# Patient Record
Sex: FEMALE | Race: OTHER | URBAN - METROPOLITAN AREA
[De-identification: names, ages, dates, MRNs, and addresses within clinical notes are randomized per-mention and may not be internally consistent; named-entity substitution may affect disease eponyms.]

---

## 2024-09-25 ENCOUNTER — EMERGENCY (EMERGENCY)
Facility: HOSPITAL | Age: 49
LOS: 1 days | Discharge: HOME | End: 2024-09-25
Attending: EMERGENCY MEDICINE | Admitting: EMERGENCY MEDICINE
Payer: COMMERCIAL

## 2024-09-25 VITALS
SYSTOLIC BLOOD PRESSURE: 108 MMHG | TEMPERATURE: 98 F | RESPIRATION RATE: 18 BRPM | OXYGEN SATURATION: 98 % | HEART RATE: 71 BPM | DIASTOLIC BLOOD PRESSURE: 68 MMHG

## 2024-09-25 VITALS
SYSTOLIC BLOOD PRESSURE: 111 MMHG | WEIGHT: 177.03 LBS | OXYGEN SATURATION: 100 % | DIASTOLIC BLOOD PRESSURE: 69 MMHG | RESPIRATION RATE: 18 BRPM | HEIGHT: 66 IN | HEART RATE: 94 BPM | TEMPERATURE: 98 F

## 2024-09-25 DIAGNOSIS — D64.9 ANEMIA, UNSPECIFIED: ICD-10-CM

## 2024-09-25 DIAGNOSIS — R06.02 SHORTNESS OF BREATH: ICD-10-CM

## 2024-09-25 DIAGNOSIS — N93.9 ABNORMAL UTERINE AND VAGINAL BLEEDING, UNSPECIFIED: ICD-10-CM

## 2024-09-25 DIAGNOSIS — R07.9 CHEST PAIN, UNSPECIFIED: ICD-10-CM

## 2024-09-25 LAB
ADD ON TEST-SPECIMEN IN LAB: SIGNIFICANT CHANGE UP
ANION GAP SERPL CALC-SCNC: 9 MMOL/L — SIGNIFICANT CHANGE UP (ref 5–17)
APTT BLD: 21.8 SEC — LOW (ref 24.5–35.6)
BASOPHILS # BLD AUTO: 0.12 K/UL — SIGNIFICANT CHANGE UP (ref 0–0.2)
BASOPHILS NFR BLD AUTO: 1.8 % — SIGNIFICANT CHANGE UP (ref 0–2)
BLD GP AB SCN SERPL QL: NEGATIVE — SIGNIFICANT CHANGE UP
BUN SERPL-MCNC: 13 MG/DL — SIGNIFICANT CHANGE UP (ref 7–23)
CALCIUM SERPL-MCNC: 9 MG/DL — SIGNIFICANT CHANGE UP (ref 8.4–10.5)
CHLORIDE SERPL-SCNC: 102 MMOL/L — SIGNIFICANT CHANGE UP (ref 96–108)
CO2 SERPL-SCNC: 25 MMOL/L — SIGNIFICANT CHANGE UP (ref 22–31)
CREAT SERPL-MCNC: 0.89 MG/DL — SIGNIFICANT CHANGE UP (ref 0.5–1.3)
EGFR: 81 ML/MIN/1.73M2 — SIGNIFICANT CHANGE UP
EGFR: 81 ML/MIN/1.73M2 — SIGNIFICANT CHANGE UP
EOSINOPHIL # BLD AUTO: 0 K/UL — SIGNIFICANT CHANGE UP (ref 0–0.5)
EOSINOPHIL NFR BLD AUTO: 0 % — SIGNIFICANT CHANGE UP (ref 0–6)
GLUCOSE SERPL-MCNC: 96 MG/DL — SIGNIFICANT CHANGE UP (ref 70–99)
HCG SERPL-ACNC: <1 MIU/ML — SIGNIFICANT CHANGE UP
HCT VFR BLD CALC: 21.4 % — LOW (ref 34.5–45)
HGB BLD-MCNC: 6.2 G/DL — CRITICAL LOW (ref 11.5–15.5)
INR BLD: 1.03 — SIGNIFICANT CHANGE UP (ref 0.85–1.16)
LYMPHOCYTES # BLD AUTO: 0.47 K/UL — LOW (ref 1–3.3)
LYMPHOCYTES # BLD AUTO: 7.1 % — LOW (ref 13–44)
MCHC RBC-ENTMCNC: 20.3 PG — LOW (ref 27–34)
MCHC RBC-ENTMCNC: 29 GM/DL — LOW (ref 32–36)
MCV RBC AUTO: 70.2 FL — LOW (ref 80–100)
MONOCYTES # BLD AUTO: 0.06 K/UL — SIGNIFICANT CHANGE UP (ref 0–0.9)
MONOCYTES NFR BLD AUTO: 0.9 % — LOW (ref 2–14)
NEUTROPHILS # BLD AUTO: 5.94 K/UL — SIGNIFICANT CHANGE UP (ref 1.8–7.4)
NEUTROPHILS NFR BLD AUTO: 90.2 % — HIGH (ref 43–77)
PLATELET # BLD AUTO: 262 K/UL — SIGNIFICANT CHANGE UP (ref 150–400)
POTASSIUM SERPL-MCNC: 3.9 MMOL/L — SIGNIFICANT CHANGE UP (ref 3.5–5.3)
POTASSIUM SERPL-SCNC: 3.9 MMOL/L — SIGNIFICANT CHANGE UP (ref 3.5–5.3)
PROTHROM AB SERPL-ACNC: 11.8 SEC — SIGNIFICANT CHANGE UP (ref 9.9–13.4)
RBC # BLD: 3.05 M/UL — LOW (ref 3.8–5.2)
RBC # FLD: 19.3 % — HIGH (ref 10.3–14.5)
RH IG SCN BLD-IMP: POSITIVE — SIGNIFICANT CHANGE UP
RH IG SCN BLD-IMP: POSITIVE — SIGNIFICANT CHANGE UP
SODIUM SERPL-SCNC: 136 MMOL/L — SIGNIFICANT CHANGE UP (ref 135–145)
WBC # BLD: 6.58 K/UL — SIGNIFICANT CHANGE UP (ref 3.8–10.5)
WBC # FLD AUTO: 6.58 K/UL — SIGNIFICANT CHANGE UP (ref 3.8–10.5)

## 2024-09-25 PROCEDURE — 83550 IRON BINDING TEST: CPT

## 2024-09-25 PROCEDURE — 86850 RBC ANTIBODY SCREEN: CPT

## 2024-09-25 PROCEDURE — 93005 ELECTROCARDIOGRAM TRACING: CPT

## 2024-09-25 PROCEDURE — 85610 PROTHROMBIN TIME: CPT

## 2024-09-25 PROCEDURE — 84702 CHORIONIC GONADOTROPIN TEST: CPT

## 2024-09-25 PROCEDURE — 86900 BLOOD TYPING SEROLOGIC ABO: CPT

## 2024-09-25 PROCEDURE — 86901 BLOOD TYPING SEROLOGIC RH(D): CPT

## 2024-09-25 PROCEDURE — 76830 TRANSVAGINAL US NON-OB: CPT | Mod: 26

## 2024-09-25 PROCEDURE — 76856 US EXAM PELVIC COMPLETE: CPT | Mod: 26

## 2024-09-25 PROCEDURE — 82728 ASSAY OF FERRITIN: CPT

## 2024-09-25 PROCEDURE — 86923 COMPATIBILITY TEST ELECTRIC: CPT

## 2024-09-25 PROCEDURE — 36415 COLL VENOUS BLD VENIPUNCTURE: CPT

## 2024-09-25 PROCEDURE — 85025 COMPLETE CBC W/AUTO DIFF WBC: CPT

## 2024-09-25 PROCEDURE — 80048 BASIC METABOLIC PNL TOTAL CA: CPT

## 2024-09-25 PROCEDURE — 99285 EMERGENCY DEPT VISIT HI MDM: CPT | Mod: 25

## 2024-09-25 PROCEDURE — 99291 CRITICAL CARE FIRST HOUR: CPT

## 2024-09-25 PROCEDURE — P9016: CPT

## 2024-09-25 PROCEDURE — 82746 ASSAY OF FOLIC ACID SERUM: CPT

## 2024-09-25 PROCEDURE — 76856 US EXAM PELVIC COMPLETE: CPT

## 2024-09-25 PROCEDURE — 76830 TRANSVAGINAL US NON-OB: CPT

## 2024-09-25 PROCEDURE — 82607 VITAMIN B-12: CPT

## 2024-09-25 PROCEDURE — 85730 THROMBOPLASTIN TIME PARTIAL: CPT

## 2024-09-25 PROCEDURE — 83540 ASSAY OF IRON: CPT

## 2024-09-25 PROCEDURE — 93010 ELECTROCARDIOGRAM REPORT: CPT

## 2024-09-25 RX ADMIN — Medication 1000 MILLILITER(S): at 16:37

## 2024-09-25 RX ADMIN — Medication 1000 MILLILITER(S): at 17:00

## 2024-10-11 ENCOUNTER — EMERGENCY (EMERGENCY)
Facility: HOSPITAL | Age: 49
LOS: 1 days | Discharge: HOME | End: 2024-10-11
Attending: STUDENT IN AN ORGANIZED HEALTH CARE EDUCATION/TRAINING PROGRAM | Admitting: STUDENT IN AN ORGANIZED HEALTH CARE EDUCATION/TRAINING PROGRAM
Payer: COMMERCIAL

## 2024-10-11 VITALS
RESPIRATION RATE: 16 BRPM | HEIGHT: 66 IN | WEIGHT: 169.98 LBS | TEMPERATURE: 98 F | HEART RATE: 67 BPM | DIASTOLIC BLOOD PRESSURE: 77 MMHG | OXYGEN SATURATION: 99 % | SYSTOLIC BLOOD PRESSURE: 117 MMHG

## 2024-10-11 VITALS
SYSTOLIC BLOOD PRESSURE: 103 MMHG | DIASTOLIC BLOOD PRESSURE: 66 MMHG | HEART RATE: 60 BPM | RESPIRATION RATE: 18 BRPM | TEMPERATURE: 99 F | OXYGEN SATURATION: 100 %

## 2024-10-11 DIAGNOSIS — R42 DIZZINESS AND GIDDINESS: ICD-10-CM

## 2024-10-11 DIAGNOSIS — R51.9 HEADACHE, UNSPECIFIED: ICD-10-CM

## 2024-10-11 DIAGNOSIS — R30.9 PAINFUL MICTURITION, UNSPECIFIED: ICD-10-CM

## 2024-10-11 DIAGNOSIS — D64.9 ANEMIA, UNSPECIFIED: ICD-10-CM

## 2024-10-11 DIAGNOSIS — N93.9 ABNORMAL UTERINE AND VAGINAL BLEEDING, UNSPECIFIED: ICD-10-CM

## 2024-10-11 DIAGNOSIS — N80.03 ADENOMYOSIS OF THE UTERUS: ICD-10-CM

## 2024-10-11 DIAGNOSIS — R10.30 LOWER ABDOMINAL PAIN, UNSPECIFIED: ICD-10-CM

## 2024-10-11 PROBLEM — Z78.9 OTHER SPECIFIED HEALTH STATUS: Chronic | Status: ACTIVE | Noted: 2024-09-25

## 2024-10-11 LAB
ANION GAP SERPL CALC-SCNC: 10 MMOL/L — SIGNIFICANT CHANGE UP (ref 5–17)
ANISOCYTOSIS BLD QL: SLIGHT — SIGNIFICANT CHANGE UP
APTT BLD: 22.8 SEC — LOW (ref 24.5–35.6)
BASOPHILS # BLD AUTO: 0 K/UL — SIGNIFICANT CHANGE UP (ref 0–0.2)
BASOPHILS NFR BLD AUTO: 0 % — SIGNIFICANT CHANGE UP (ref 0–2)
BLD GP AB SCN SERPL QL: NEGATIVE — SIGNIFICANT CHANGE UP
BUN SERPL-MCNC: 13 MG/DL — SIGNIFICANT CHANGE UP (ref 7–23)
CALCIUM SERPL-MCNC: 8.7 MG/DL — SIGNIFICANT CHANGE UP (ref 8.4–10.5)
CHLORIDE SERPL-SCNC: 102 MMOL/L — SIGNIFICANT CHANGE UP (ref 96–108)
CO2 SERPL-SCNC: 23 MMOL/L — SIGNIFICANT CHANGE UP (ref 22–31)
CREAT SERPL-MCNC: 0.7 MG/DL — SIGNIFICANT CHANGE UP (ref 0.5–1.3)
EGFR: 108 ML/MIN/1.73M2 — SIGNIFICANT CHANGE UP
ELLIPTOCYTES BLD QL SMEAR: SLIGHT — SIGNIFICANT CHANGE UP
EOSINOPHIL # BLD AUTO: 0 K/UL — SIGNIFICANT CHANGE UP (ref 0–0.5)
EOSINOPHIL NFR BLD AUTO: 0 % — SIGNIFICANT CHANGE UP (ref 0–6)
GIANT PLATELETS BLD QL SMEAR: PRESENT — SIGNIFICANT CHANGE UP
GLUCOSE SERPL-MCNC: 107 MG/DL — HIGH (ref 70–99)
HCG SERPL-ACNC: <1 MIU/ML — SIGNIFICANT CHANGE UP
HCT VFR BLD CALC: 25.3 % — LOW (ref 34.5–45)
HGB BLD-MCNC: 7.4 G/DL — LOW (ref 11.5–15.5)
HYPOCHROMIA BLD QL: SIGNIFICANT CHANGE UP
INR BLD: 1.03 — SIGNIFICANT CHANGE UP (ref 0.85–1.16)
LYMPHOCYTES # BLD AUTO: 0.28 K/UL — LOW (ref 1–3.3)
LYMPHOCYTES # BLD AUTO: 3.5 % — LOW (ref 13–44)
MANUAL SMEAR VERIFICATION: SIGNIFICANT CHANGE UP
MCHC RBC-ENTMCNC: 20.6 PG — LOW (ref 27–34)
MCHC RBC-ENTMCNC: 29.2 GM/DL — LOW (ref 32–36)
MCV RBC AUTO: 70.3 FL — LOW (ref 80–100)
MICROCYTES BLD QL: SLIGHT — SIGNIFICANT CHANGE UP
MONOCYTES # BLD AUTO: 0.34 K/UL — SIGNIFICANT CHANGE UP (ref 0–0.9)
MONOCYTES NFR BLD AUTO: 4.3 % — SIGNIFICANT CHANGE UP (ref 2–14)
NEUTROPHILS # BLD AUTO: 7.36 K/UL — SIGNIFICANT CHANGE UP (ref 1.8–7.4)
NEUTROPHILS NFR BLD AUTO: 92.2 % — HIGH (ref 43–77)
PLAT MORPH BLD: ABNORMAL
PLATELET # BLD AUTO: 215 K/UL — SIGNIFICANT CHANGE UP (ref 150–400)
POIKILOCYTOSIS BLD QL AUTO: SIGNIFICANT CHANGE UP
POLYCHROMASIA BLD QL SMEAR: SIGNIFICANT CHANGE UP
POTASSIUM SERPL-MCNC: 4.1 MMOL/L — SIGNIFICANT CHANGE UP (ref 3.5–5.3)
POTASSIUM SERPL-SCNC: 4.1 MMOL/L — SIGNIFICANT CHANGE UP (ref 3.5–5.3)
PROTHROM AB SERPL-ACNC: 12 SEC — SIGNIFICANT CHANGE UP (ref 9.9–13.4)
RBC # BLD: 3.6 M/UL — LOW (ref 3.8–5.2)
RBC # FLD: 21.2 % — HIGH (ref 10.3–14.5)
RBC BLD AUTO: ABNORMAL
RH IG SCN BLD-IMP: POSITIVE — SIGNIFICANT CHANGE UP
SCHISTOCYTES BLD QL AUTO: SIGNIFICANT CHANGE UP
SODIUM SERPL-SCNC: 135 MMOL/L — SIGNIFICANT CHANGE UP (ref 135–145)
TARGETS BLD QL SMEAR: SLIGHT — SIGNIFICANT CHANGE UP
WBC # BLD: 7.98 K/UL — SIGNIFICANT CHANGE UP (ref 3.8–10.5)
WBC # FLD AUTO: 7.98 K/UL — SIGNIFICANT CHANGE UP (ref 3.8–10.5)

## 2024-10-11 PROCEDURE — 86850 RBC ANTIBODY SCREEN: CPT

## 2024-10-11 PROCEDURE — 85610 PROTHROMBIN TIME: CPT

## 2024-10-11 PROCEDURE — 86900 BLOOD TYPING SEROLOGIC ABO: CPT

## 2024-10-11 PROCEDURE — 86923 COMPATIBILITY TEST ELECTRIC: CPT

## 2024-10-11 PROCEDURE — 80048 BASIC METABOLIC PNL TOTAL CA: CPT

## 2024-10-11 PROCEDURE — 99285 EMERGENCY DEPT VISIT HI MDM: CPT | Mod: 25

## 2024-10-11 PROCEDURE — 36415 COLL VENOUS BLD VENIPUNCTURE: CPT

## 2024-10-11 PROCEDURE — 99285 EMERGENCY DEPT VISIT HI MDM: CPT

## 2024-10-11 PROCEDURE — P9016: CPT

## 2024-10-11 PROCEDURE — 84702 CHORIONIC GONADOTROPIN TEST: CPT

## 2024-10-11 PROCEDURE — 85025 COMPLETE CBC W/AUTO DIFF WBC: CPT

## 2024-10-11 PROCEDURE — 85730 THROMBOPLASTIN TIME PARTIAL: CPT

## 2024-10-11 PROCEDURE — 86901 BLOOD TYPING SEROLOGIC RH(D): CPT

## 2024-10-11 NOTE — ED PROVIDER NOTE - PROGRESS NOTE DETAILS
Seen by GYN, cleared from their standpoint.  GYN recommends TXA TID for next 5 days.    Pt has appointment with her own GYN this upcoming Monday, and recommend she attend that appointment. Transfusion completed, no complications.

## 2024-10-11 NOTE — ED PROVIDER NOTE - CLINICAL SUMMARY MEDICAL DECISION MAKING FREE TEXT BOX
Symptomatic anemia due to heavy vaginal bleeding. Used 3 pads in apx 12 hours.  No significant bleeding per GYN consultant's exam in ED. Not orthostatic. No signficant pain/tenderness.  Had imaging for similar presentation here recently, ? adenomyosis.    Plan: PRBC, reassess.

## 2024-10-11 NOTE — ED ADULT NURSE NOTE - NSFALLUNIVINTERV_ED_ALL_ED
Bed/Stretcher in lowest position, wheels locked, appropriate side rails in place/Call bell, personal items and telephone in reach/Instruct patient to call for assistance before getting out of bed/chair/stretcher/Non-slip footwear applied when patient is off stretcher/[place] to call system/Physically safe environment - no spills, clutter or unnecessary equipment/Purposeful proactive rounding/Room/bathroom lighting operational, light cord in reach

## 2024-10-11 NOTE — ED PROVIDER NOTE - OBJECTIVE STATEMENT
Pt is a 49y/o F with no pmh presenting with vaginal bleeding x 1 day. Pt noticed heavy vaginal bleeding and has soaked a total of 3 pads. Additionally, she has symptoms of lightheadedness, lower abdominal pain, and mild burning with urination. Pt states LMP was 3 weeks ago where she had similar heavy bleeding. At that time, she presented to the ED and received 1 blood transfusion. Normally, pt has heavy menses that lasts about 5 days.     Denies previous gyn sx, CP, SOB, fever, chills, and n/v. Pt is a 49y/o F with pmh of anemia and tubal ligation presenting with vaginal bleeding x 1 day. Pt noticed heavy vaginal bleeding and has soaked a total of 3 pads. Additionally, she has symptoms of lightheadedness, lower abdominal pain, and mild burning with urination. Pt states LMP was 3 weeks ago where she had similar heavy bleeding. At that time, she presented to the ED and received 1 blood transfusion. Normally, pt has heavy menses that lasts about 5 days.     Denies previous gyn sx, CP, SOB, fever, chills, and n/v. Pt is a 47y/o F with pmh of anemia and tubal ligation presenting with vaginal bleeding x 1 day. Pt noticed heavy vaginal bleeding and has soaked a total of 3 pads. Additionally, she has symptoms of lightheadedness, lower abdominal pain, and mild burning with urination. Pt states LMP was 3 weeks ago where she had similar heavy bleeding. At that time, she presented to the ED and received 1 blood transfusion. Normally, pt has heavy menses that lasts about 5 days.     Denies CP, SOB, fever, chills, and n/v/d. Pt is a 47y/o F with pmh of anemia and tubal ligation presenting with vaginal bleeding x 1 day. Pt noticed heavy vaginal bleeding and has soaked a total of 3 pads. Additionally, she has symptoms of lightheadedness, lower abdominal pain, and mild burning with urination.  States the lightheadedness occurs when she stands and lasts just a couple of seconds.  Has not fainted.  Pt states LMP was 3 weeks ago where she had similar heavy bleeding. At that time, she presented to the ED and received 1 blood transfusion.  Felt much better after the transfusion.  Normally, pt has heavy menses that lasts about 5 days.     Denies CP, SOB, fever, chills, and n/v/d.

## 2024-10-11 NOTE — ED PROVIDER NOTE - PHYSICAL EXAMINATION
CONSTITUTIONAL: NAD   SKIN: Normal color and turgor.    HEAD: NC/AT.  EYES: Conjunctiva clear. Anicteric sclera.  ENT: Airway clear. Normal voice. MMM.  RESPIRATORY:  Normal respiratory rate and effort. Lungs CTAB  CARDIOVASCULAR:  RRR S1S2  GI:  Abdomen soft, nontender.    : mild suprapubic tenderness to deep palpation, no guarding. no CVAT  MSK: Neck supple.  No LE edema or calf tenderness. No joint swelling or ROM limitation.  NEURO: Alert, clear mental status.  Speech clear. No focal deficits. Gait steady.

## 2024-10-11 NOTE — ED PROVIDER NOTE - ATTENDING APP SHARED VISIT CONTRIBUTION OF CARE
48 year old female, recent ED visit here for anemia 2/2 heavy vaginal bleeding, returning to ED for symptomatic anemia in setting of persistent vaginal bleeding. Vitals wnl and stable. Hgb level 7.4 but symptomatic (weak/presyncopal). Ordered for 1u PRBC transfusion. Seen by GYN team. Recommending DC s/p transfusion with TXA and outpatient f/u.

## 2024-10-11 NOTE — ED ADULT NURSE NOTE - OBJECTIVE STATEMENT
48 y.o. female presents to ED c/o copious bright red vaginal bleeding with clots varying in size for 12-14 hours. Pt states she has anemia r/t heavy menstrual periods eval here in [facility] x3 weeks ago and was given 1 PRBC transfusion and d/c home. Pt on arrival today feeling dizzy/lightheaded, pain to urinate, and having suprapubic discomfort. Denies cp, sob, f/c, nausea, vomiting, black or tarry stool, burning with urination. AAOx3. ambulatory.

## 2024-10-11 NOTE — ED PROVIDER NOTE - PATIENT PORTAL LINK FT
You can access the FollowMyHealth Patient Portal offered by [facility] by registering at the following website: [email or web address]. By joining [facility]’s FollowMyHealth portal, you will also be able to view your health information using other applications (apps) compatible with our system.

## 2024-10-11 NOTE — CONSULT NOTE ADULT - SUBJECTIVE AND OBJECTIVE BOX
Patient is a 47 y/o  w/ hx anemia, LMP  presenting with vaginal bleeding that began last night. She was originally seen in ED on  for vaginal bleeding and was transfused and dc'd with follow up. She states she stopped bleeding that day and then started again last night and has a headache which is why she re-presented to ED. Pt reports that she normally gets her period for 5 days each month, going through 4-5 pads per day. Pt reports that her last period, which started 2 weeks ago has been heavier than normal, and she has been soaking through 10 pads per day however she has been having heavier periods ever since she had her twins in . She denies lightheadedness, dizziness, shortness of breath.  Pt is not using any form of birth control now but is s/p tubal ligation.   Pt denies fever, chills, abdominal pain, nausea, vomiting, dysuria, hematuria.       OB Hx:   C section x 2 (, ),  x1, SAB x2. Pt reports normal pregnancies, notes that her blood pressure was mildly elevated during her last two deliveries.   GYN Hx: Denies fibroids, cysts, STIs, abnormal pap smears. Pt reports last pap was 1 year ago and was normal. Denies family hx of heavy menses, fibroids. Pt denies family hx of blood disorders.   Last PAP smear: 1 year ago, WNL per patient (pt does not have a regular gynecologist and goes to a walk in clinic)    PMHx: Anemia   SHx: C section x 2, uncomplicated  Meds: Denies  Allergies: NKDA      PHYSICAL EXAM:   Vital Signs Last 24 Hrs  T(C): 36.9 (11 Oct 2024 15:35), Max: 37.3 (11 Oct 2024 15:12)  T(F): 98.5 (11 Oct 2024 15:35), Max: 99.1 (11 Oct 2024 15:12)  HR: 80 (11 Oct 2024 15:35) (67 - 86)  BP: 112/68 (11 Oct 2024 15:35) (110/73 - 117/77)  BP(mean): --  RR: 18 (11 Oct 2024 15:35) (16 - 18)  SpO2: 100% (11 Oct 2024 15:35) (99% - 100%)    Parameters below as of 11 Oct 2024 15:35  Patient On (Oxygen Delivery Method): room air        **************************  Constitutional: Alert & Oriented x3, No acute distress  Respiratory: no increased WOB  Cardiovascular: VSS  Gastrointestinal: soft, non tender, positive bowel sounds, no rebound or guarding   Pelvic exam: no active bleeding from cervical os, 10 cc dark brown blood in vault, no CMT, no adnexal tenderness or masses, globular uterus  Extremities: no calf tenderness or swelling      LABS:                        7.4    7.98  )-----------( 215      ( 11 Oct 2024 12:06 )             25.3     10-11    135  |  102  |  13  ----------------------------<  107[H]  4.1   |  23  |  0.70    Ca    8.7      11 Oct 2024 12:06      PT/INR - ( 11 Oct 2024 12:07 )   PT: 12.0 sec;   INR: 1.03          PTT - ( 11 Oct 2024 12:07 )  PTT:22.8 sec  Urinalysis Basic - ( 11 Oct 2024 12:06 )    Color: x / Appearance: x / SG: x / pH: x  Gluc: 107 mg/dL / Ketone: x  / Bili: x / Urobili: x   Blood: x / Protein: x / Nitrite: x   Leuk Esterase: x / RBC: x / WBC x   Sq Epi: x / Non Sq Epi: x / Bacteria: x      HCG Quantitative, Serum: <1 mIU/mL (10-11 @ 13:34)      RADIOLOGY & ADDITIONAL STUDIES:    PROCEDURE DATE:  2024          INTERPRETATION:  CLINICAL INFORMATION: Vaginal bleeding with dizziness.   History of     LMP: 2024    COMPARISON: None available.    TECHNIQUE:  Endovaginal and transabdominal pelvic sonogram. Color and Spectral   Doppler was performed.    FINDINGS:  Uterus: 13.2 x 6.2 x 7.3 cm. Suggestion of Venetian blind appearance   which suggests adenomyosis uterus is retroflexed  Endometrium: 15 mm. Normal trilaminar appearance.    Right ovary: 2.4 x 1.1 x 2.0 cm. Within normal limits. Normal arterial   and venous waveforms.  Left ovary: 3.5 x 2.7 x 2.9 cm. Within normal limits. Normal arterial and   venous waveforms.    Fluid: None.    IMPRESSION:  Normal appearance of the endometrium without fibroids. Adenomyosis is   questioned. Consider further evaluation with MR imaging  Unremarkable appearance of the ovaries    --- End of Report ---

## 2024-10-11 NOTE — CONSULT NOTE ADULT - ASSESSMENT
49 yo with vaginal bleeding (second episode in past two weeks). Patient started having bleeding last night, used 7 pads since last night and has headache, no lightheadedness, dizziness, shortness of breath. Hg 7.4 and patient is currently being transfused per ED. Patient has adenomyosis per imaging on 9/25 and has outpatient appointment with obgyn on Monday 10/14. On exam no active bleeding from cervix, about 10cc dark red blood in vaginal vault, no concern for active clots/bleeding  -patient is currently hemodynamically and clinically stable  -transfusion per ED  -recommend TXA 1300 TID x 5 days, patient denies any history of migraines, clotting disorders, smoking  -patient has appointment with obgyn three days, explained that in the setting of chronic vaginal bleeding and ultrasound showing adenomyosis the ultimate solution would be for hysterectomy which patient understands and agrees with. She will follow up with her outpatient obgyn regarding this  -return precautions discussed including heavy vaginal bleeding, shortness of breath, lightheadedness or any other acute complaints    discussed with senior resident Dr. Singh and attending Dr. Eileen CLARK PGY2

## 2024-10-11 NOTE — ED PROVIDER NOTE - NSFOLLOWUPINSTRUCTIONS_ED_ALL_ED_FT
Take tranexamic acid 3 times/day for the next 5 days.  Please see your gynecologist on Monday as scheduled.    Return to ER for new/worsening symptoms, or any concerns.   ----------------------  Heimdal ácido tranexámico 3 veces al día vinh los próximos 5 días. Visite a san ginecólogo el lunes según lo programado.      Regrese a la ebulah de emergencias si tiene síntomas nuevos o que empeoran, o si tiene alguna inquietud.  ============================  Sangrado uterino anormal  Abnormal Uterine Bleeding  A female body showing the reproductive organs, with a close-up view of the uterus and vagina.  El sangrado uterino anormal sucede cuando tiene un sangrado anormal del útero. Puede tratarse de sangrado después de tener relaciones sexuales, o sangrado o manchas entre los períodos menstruales. También puede tratarse de un sangrado más abundante de lo normal, períodos menstruales que chowdhury más de lo normal o sangrado que ocurre después de la menopausia.    El sangrado uterino anormal puede afectar a las adolescentes, las mujeres en edad fértil, las embarazadas y las mujeres que brantley llegado a la menopausia. Las causas más comunes de sangrado uterino anormal incluyen lo siguiente:  Embarazo.  Crecimientos anormales dentro del revestimiento del útero (pólipos).  Tumores o crecimientos benignos en el útero (fibromas). Estos no son tumores cancerosos.  Infección.  Cáncer.  Cantidad excesiva o insuficiente de algunas hormonas en el cuerpo (desequilibrios hormonales).  El médico debe evaluar cualquier clase de sangrado anormal. Muchos casos son leves y simples de tratar, mientras que otros pueden ser más graves. El tratamiento dependerá de la causa del sangrado y de san gravedad.    Siga estas indicaciones en san casa:  Medicamentos    Use los medicamentos de venta preet y los recetados solamente isra se lo haya indicado el médico.  Consulte al médico si debe hacer o no lo siguiente:  Venkatesh medicamentos isra aspirina e ibuprofeno. Estos medicamentos pueden tener un efecto anticoagulante en la sharad. No tome estos medicamentos a menos que el médico se lo indique.  Usar medicamentos de venta preet, vitaminas, hierbas y suplementos.  Si le recetaron comprimidos de vicente, tómelos isra se lo haya indicado el médico. Los comprimidos de vicente ayudan a reponer el vicente que el organismo pierde a causa de esta afección.  Control del estreñimiento    En los casos de sangrado intenso, posiblemente le indiquen que aumente la ingesta de vicente para tratar la anemia. Hacer esto puede causarle estreñimiento. Para prevenir o tratar el estreñimiento, es posible que deba hacer lo siguiente:  Beber suficiente líquido isra para mantener la orina de color amarillo pálido.  Usar medicamentos recetados o de venta preet.  Consumir alimentos ricos en fibra, isra frijoles, cereales integrales, y frutas y verduras frescas.  Limitar el consumo de alimentos ricos en grasa y azúcares procesados, isra los alimentos fritos o dulces.  Actividad    Modifique san actividad para disminuir el sangrado si necesita cambiar la toalla higiénica más de hiram vez cada 2 horas:  Acuéstese en la cama con los pies levantados (elevados).  Coloque hiram compresa fría en la aranza inferior del abdomen.  Descanse todo lo que pueda hasta que el sangrado se detenga o no sea tan intenso.  Indicaciones generales    No use tampones ni duchas vaginales, ni tenga relaciones sexuales hasta que el médico la autorice.  Cambie las toallas higiénicas con frecuencia.  Hágase exámenes regulares. Estos incluyen exámenes pélvicos y pruebas de detección de cáncer de keely uterino.  Es san responsabilidad obtener los resultados de cualquier examen que se realice. Consulte al médico o pregunte en el departamento donde se realizan las pruebas cuándo estarán listos los resultados.  Controle san afección para amanda si hay cambios. Vinh 2 meses, anote:  Cuándo comienza san período menstrual.  Cuándo finaliza san período menstrual.  Cuándo se produce alguna hemorragia vaginal anormal.  Qué problemas observa.  Concurra a todas las visitas de seguimiento. [place] es importante.  Comuníquese con un médico si:  Tiene sangrado que dura más de hiram semana.  Se siente mareada por momentos.  Siente náuseas o vomita.  Se siente débil o siente que va a desvanecerse.  Nota otros cambios que muestran que san afección está empeorando.  Solicite ayuda de inmediato si:  Se desmaya.  Tiene sangrado que empapa hiram toalla higiénica cada hora.  Tiene dolor en el abdomen.  Tiene fiebre o escalofríos.  Se siente débil o presenta sudoración.  Elimina coágulos de sharad grandes por la vagina.  Estos síntomas pueden representar un problema grave que constituye hiram emergencia. No espere a amanda si los síntomas desaparecen. Solicite atención médica de inmediato. Comuníquese con el servicio de emergencias de san localidad (911 en los [place]). No conduzca por jt propios medios hasta el hospital.    Resumen  El sangrado uterino anormal sucede cuando tiene un sangrado anormal del útero.  El médico debe evaluar cualquier clase de sangrado anormal. Muchos casos son leves y simples de tratar, mientras que otros pueden ser más graves.  El tratamiento dependerá de la causa del sangrado y de san gravedad.  Obtenga ayuda de inmediato si se desmaya, tiene sangrado que empapa hiram toalla higiénica cada hora o elimina coágulos de sharad grandes por la vagina.  Esta información no tiene isra fin reemplazar el consejo del médico. Asegúrese de hacerle al médico cualquier pregunta que tenga.

## 2024-10-11 NOTE — ED ADULT NURSE REASSESSMENT NOTE - NS ED NURSE REASSESS COMMENT FT1
report given to Ramone Godinez RN as patient moved to [place] for blood transfusion/higher acuity care. Safety maintained, all needs met.

## 2024-10-11 NOTE — ED ADULT NURSE NOTE - CAS TRG GEN SKIN CONDITION
Warm/Dry n Staging Info: By selecting yes to the question above you will include information on AJCC 8 tumor staging in your Mohs note. Information on tumor staging will be automatically added for SCCs on the head and neck. AJCC 8 includes tumor size, tumor depth, perineural involvement and bone invasion.

## 2025-05-30 ENCOUNTER — EMERGENCY (EMERGENCY)
Facility: HOSPITAL | Age: 50
LOS: 1 days | End: 2025-05-30
Attending: STUDENT IN AN ORGANIZED HEALTH CARE EDUCATION/TRAINING PROGRAM | Admitting: STUDENT IN AN ORGANIZED HEALTH CARE EDUCATION/TRAINING PROGRAM
Payer: COMMERCIAL

## 2025-05-30 VITALS
TEMPERATURE: 98 F | HEART RATE: 80 BPM | DIASTOLIC BLOOD PRESSURE: 75 MMHG | OXYGEN SATURATION: 97 % | WEIGHT: 160.06 LBS | SYSTOLIC BLOOD PRESSURE: 113 MMHG | RESPIRATION RATE: 17 BRPM

## 2025-05-30 PROCEDURE — 99283 EMERGENCY DEPT VISIT LOW MDM: CPT

## 2025-05-30 RX ORDER — IBUPROFEN 200 MG
600 TABLET ORAL ONCE
Refills: 0 | Status: COMPLETED | OUTPATIENT
Start: 2025-05-30 | End: 2025-05-30

## 2025-05-30 RX ADMIN — Medication 600 MILLIGRAM(S): at 14:21

## 2025-05-30 RX ADMIN — Medication 600 MILLIGRAM(S): at 14:12

## 2025-05-30 NOTE — ED PROVIDER NOTE - PATIENT PORTAL LINK FT
You can access the FollowMyHealth Patient Portal offered by [facility] by registering at the following website: [email or web address]. By joining [employer]’s FollowMyHealth portal, you will also be able to view your health information using other applications (apps) compatible with our system.

## 2025-05-30 NOTE — ED PROVIDER NOTE - OBJECTIVE STATEMENT
49F here for right sided upper and lower molar dental pain. Tried to f/u with dentist but no appt for 2 months. Teeth decaying there. No fevers, chills, abscess, difficulty swallowing.

## 2025-05-30 NOTE — ED ADULT NURSE NOTE - CHIEF COMPLAINT
Letter signed and sent via pt portal.   
Pt's mom called needing a letter stating the pt needs to drink non lactose milk for . She would like note in patient portal.    Please advise.  
See nurse triage encounter 9/19. Pt was advised to use dairy alternative milk for diarrhea symptoms.     Mother states she would like note to states pt can get Lactaid at . Mother states note on pt portal is sufficient.     Please advise if school note can be provided.  
Yes, that is fine.   
The patient is a 49y Female complaining of dental pain/injury.
96

## 2025-05-30 NOTE — ED PROVIDER NOTE - CLINICAL SUMMARY MEDICAL DECISION MAKING FREE TEXT BOX
PT here for dental pain 2/2 teeth decay. No signs of abscess or infection. Supportive care and dental f/u.

## 2025-05-30 NOTE — ED PROVIDER NOTE - NSFOLLOWUPINSTRUCTIONS_ED_ALL_ED_FT
Call [phone] to schedule your first appointment at [facility] is located at [facility] [address] [facility] (between [address] in [place].    Take ibuprofen or tylenol as needed for pain.

## 2025-05-30 NOTE — ED PROVIDER NOTE - ENMT, MLM
Airway patent, Nasal mucosa clear. Mouth with normal mucosa. Throat has no vesicles, no oropharyngeal exudates and uvula is midline.  Right sided upper and lower molar teeth decaying. No abscess. No purulent discharge or fluctuance.

## 2025-05-30 NOTE — ED ADULT NURSE NOTE - OBJECTIVE STATEMENT
Right upper jaw dental pain X 2 days, no discharge, no fever/chills, OTC meds not working, wants dental referral.

## 2025-06-02 DIAGNOSIS — K02.9 DENTAL CARIES, UNSPECIFIED: ICD-10-CM

## 2025-06-02 DIAGNOSIS — K08.89 OTHER SPECIFIED DISORDERS OF TEETH AND SUPPORTING STRUCTURES: ICD-10-CM

## 2025-07-07 ENCOUNTER — EMERGENCY (EMERGENCY)
Facility: HOSPITAL | Age: 50
LOS: 1 days | End: 2025-07-07
Admitting: EMERGENCY MEDICINE
Payer: COMMERCIAL

## 2025-07-07 VITALS
OXYGEN SATURATION: 100 % | TEMPERATURE: 98 F | SYSTOLIC BLOOD PRESSURE: 121 MMHG | DIASTOLIC BLOOD PRESSURE: 77 MMHG | HEART RATE: 67 BPM | WEIGHT: 184.97 LBS | RESPIRATION RATE: 17 BRPM | HEIGHT: 66 IN

## 2025-07-07 PROCEDURE — 96372 THER/PROPH/DIAG INJ SC/IM: CPT

## 2025-07-07 PROCEDURE — 99283 EMERGENCY DEPT VISIT LOW MDM: CPT | Mod: 25

## 2025-07-07 PROCEDURE — 99284 EMERGENCY DEPT VISIT MOD MDM: CPT

## 2025-07-07 RX ORDER — CYCLOBENZAPRINE HYDROCHLORIDE 15 MG/1
1 CAPSULE, EXTENDED RELEASE ORAL
Qty: 14 | Refills: 0
Start: 2025-07-07 | End: 2025-07-13

## 2025-07-07 RX ORDER — NAPROXEN SODIUM 275 MG
1 TABLET ORAL
Qty: 14 | Refills: 0
Start: 2025-07-07 | End: 2025-07-13

## 2025-07-07 RX ORDER — KETOROLAC TROMETHAMINE 30 MG/ML
30 INJECTION, SOLUTION INTRAMUSCULAR; INTRAVENOUS ONCE
Refills: 0 | Status: DISCONTINUED | OUTPATIENT
Start: 2025-07-07 | End: 2025-07-07

## 2025-07-07 RX ADMIN — KETOROLAC TROMETHAMINE 30 MILLIGRAM(S): 30 INJECTION, SOLUTION INTRAMUSCULAR; INTRAVENOUS at 14:38

## 2025-07-07 NOTE — ED PROVIDER NOTE - IN ACCORDANCE WITH [place] STATE LAW, WE OFFER EVERY PATIENT A HEPATITIS C TEST. WOULD YOU LIKE TO BE TESTED TODAY?
Subjective:       Patient ID: Dina Martínez is a 49 y.o. female.    Chief Complaint: Follow-up    50 yo F here for f/u of epigastric pain. She states that the prilosec and carafate helped with the chest pain which is completely resolved.  She continues to have epigastric/LUQ pain when eating certain foods.  It also is improved from previous but not completely resolved.  She describes it as mild to moderate, indigestion, stabbing pain with some cramping, associated with nausea but no vomiting.  When it occurs, it is improved with carafate but she has run out of that medicine.  Her BM continue to be normal and are not associated with her pain symptoms.  Her father  of colon cancer at age 74 and she has personal h/o colon polyps.  Recent EGD unrevealing.      Review of Systems   Constitutional: Negative for appetite change.   Respiratory: Negative for chest tightness, shortness of breath and wheezing.    Cardiovascular: Negative for chest pain, palpitations and leg swelling.       Objective:      Physical Exam   Constitutional: She is oriented to person, place, and time. She appears well-developed and well-nourished.   Cardiovascular: Normal rate and regular rhythm.    Pulmonary/Chest: Effort normal and breath sounds normal.   Abdominal: Soft. Bowel sounds are normal. She exhibits no distension and no mass. There is no tenderness. There is no rebound and no guarding.   Neurological: She is alert and oriented to person, place, and time.   Psychiatric: She has a normal mood and affect. Her behavior is normal. Judgment and thought content normal.       Lab Results   Component Value Date    WBC 6.48 2018    HGB 13.2 2018    HCT 39.8 2018    MCV 81 (L) 2018     2018     Lab Results   Component Value Date    IRON 53 2018    TIBC 428 2018    FERRITIN 54 2018       Assessment:       1. Epigastric pain    2. Family history of colon cancer    3. Gastroesophageal  reflux disease without esophagitis    4. Nausea    5. Personal history of colonic polyps        Plan:       Epigastric pain; Gastroesophageal reflux disease without esophagitis; Nausea  -     sucralfate (CARAFATE) 1 gram tablet; Take 1 tablet (1 g total) by mouth 4 (four) times daily.  Dispense: 120 tablet; Refill: 6  -     Cont PPI  -     Ok to take the carafate just as needed, not daily  -     NM Gastric Emptying; Future; Expected date: 07/19/2018  -     sucralfate (CARAFATE) 1 gram tablet; Take 1 tablet (1 g total) by mouth 4 (four) times daily.  Dispense: 120 tablet; Refill: 6  -     A portion of her symptoms appear related to GERD as they improved with PPI therapy    Family history of colon cancer; Personal history of colonic polyps  -     sod picosulf-mag ox-citric ac (PREPOPIK) 10 mg-3.5 gram-12 gram PwPk; Take 1 Package by mouth once. Use as directed on prep instructions given to you from the office for 1 dose  Dispense: 1 each; Refill: 0  -     Case request GI: COLONOSCOPY               Opt out

## 2025-07-07 NOTE — ED PROVIDER NOTE - OBJECTIVE STATEMENT
50 y/o f presents c/o left upper back radiating to left shoulder and arm x 3 weeks.  Pt stating pain started gradually, thinks "I slept on it wrong" but hasn't had much improvement with tylenol.  Denies numbness/tingling to ext, fall/trauma, all other ROS negative.

## 2025-07-07 NOTE — ED ADULT NURSE NOTE - NSFALLUNIVINTERV_ED_ALL_ED
Bed/Stretcher in lowest position, wheels locked, appropriate side rails in place/Call bell, personal items and telephone in reach/Instruct patient to call for assistance before getting out of bed/chair/stretcher/Non-slip footwear applied when patient is off stretcher/[place] to call system/Physically safe environment - no spills, clutter or unnecessary equipment/Purposeful proactive rounding/Room/bathroom lighting operational, light cord in reach

## 2025-07-07 NOTE — ED PROVIDER NOTE - MUSCULOSKELETAL, MLM
no midline spine tenderness, +left side trapezius TTP, no swelling, no skin discoloration, no bony shoulder tenderness, strength 5/5, sensation intact distally

## 2025-07-07 NOTE — ED ADULT NURSE NOTE - OBJECTIVE STATEMENT
Pt is a 48y/o F denies PMHx presenting to the ED w/ c/o of L-shoulder, L-upper back, L-arm pain x3days. Denies known injury, numbness/tingling, "I may have slept on it weird." Took Tylenol @ 9am w/ some relief. Pt A/Ox3, speaking in clear/complete sentences. Respirations easy/even and unlabored on RA. Pt ambulates independently w/ steady gait. Pt resting comfortably in chair, no acute distress. Pending ED provider eval.

## 2025-07-07 NOTE — ED ADULT TRIAGE NOTE - CHIEF COMPLAINT QUOTE
"I have left arm pain for 3 days now and it is worse at night when I sleep it is worse". 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

## 2025-07-07 NOTE — ED PROVIDER NOTE - PATIENT PORTAL LINK FT
You can access the FollowMyHealth Patient Portal offered by [facility] by registering at the following website: [email or web address]. By joining [facility]’s FollowMyHealth portal, you will also be able to view your health information using other applications (apps) compatible with our system.

## 2025-07-07 NOTE — ED PROVIDER NOTE - ENMT, MLM
Airway patent Graft Cartilage Fenestration Text: The cartilage was fenestrated with a 2mm punch biopsy to help facilitate graft survival and healing.

## 2025-07-07 NOTE — ED PROVIDER NOTE - NSFOLLOWUPINSTRUCTIONS_ED_ALL_ED_FT
Musculoskeletal Pain    WHAT YOU NEED TO KNOW:    What do I need to know about musculoskeletal pain? Musculoskeletal pain can occur in muscles, bones, joints, ligaments, tendons, or nerves. The pain can be dull, achy, or sharp. You may have pain and tenderness to the touch as well. The pain can occur anywhere in your body. Musculoskeletal pain can be from an injury, surgery, or a medical condition such as polymyositis.    How is the cause of musculoskeletal pain diagnosed? Your healthcare provider will ask about past medical conditions or injuries. Your provider may touch, press, bend, stretch, or move the painful area. Tell your provider when the pain started and if the pain is constant or comes and goes. Tell your provider if the pain is dull, sharp, or achy. Also tell your provider if the pain wakes you from sleep. You may also need any of the following tests:    X-ray, MRI, or CT scan pictures may show an injury or health condition that is causing your pain. Contrast liquid may be given to help the area show up better in the pictures. Tell the healthcare provider if you have ever had an allergic reaction to contrast liquid. Do not enter the MRI room with anything metal. Metal can cause serious injury. Tell the provider if you have any metal in or on your body.    Ultrasound pictures may show problems in your muscles or tissues that are causing your pain.  How is musculoskeletal pain treated?    NSAIDs help decrease swelling and pain or fever. This medicine is available with or without a doctor's order. NSAIDs can cause stomach bleeding or kidney problems in certain people. If you take blood thinner medicine, always ask your healthcare provider if NSAIDs are safe for you. Always read the medicine label and follow directions.    Acetaminophen decreases pain and fever. It is available without a doctor's order. Ask how much to take and how often to take it. Follow directions. Read the labels of all other medicines you are using to see if they also contain acetaminophen, or ask your doctor or pharmacist. Acetaminophen can cause liver damage if not taken correctly.    Muscle relaxers help relax your muscles to decrease pain and muscle spasms.    Steroids may be given to decrease redness, pain, and swelling.    A pain cream, gel, or patch may be applied to your skin on painful areas.  What can I do to manage my symptoms?    Rest as directed. Avoid activity that causes pain. You may be able to return to normal activity when you can move without pain. Follow directions for rest and activity.    Ice the painful area to decrease pain and swelling. Use an ice pack, or put ice in a plastic bag and cover it with a towel. Always put a cloth between the ice and your skin. Apply the ice as often as directed for the first 24 to 48 hours.    Apply heat to the area as directed. Heat helps decrease pain and muscle spasms. Your healthcare provider will tell you when and how often to apply heat.    Apply compression to the area, if directed. Your provider may want you to use a splint, brace, or elastic bandage. Compression helps decrease pain and swelling. A splint, brace, or bandage will also help protect the painful area when you move around.  How to Wrap an Elastic Bandage      Elevate (raise) the painful area to reduce swelling and pain. Use pillows, blankets, or rolled towels to elevate the area above the level of your heart. Elevate the area as often as you can.    Elevate Leg      Ask your provider if alternative therapies are right for you. Examples include acupuncture, relaxation, and massage. These therapies may help reduce pain.  When should I seek immediate care?    You have severe pain when you move the area.    You lose feeling in the area.    You have new or worse pain or swelling in the area. Your skin may feel tight.  When should I call my doctor?    You have a fever.    You have pain that does not get better with treatment.    You have trouble sleeping because of your pain.    Your painful area becomes more tender, red, and warm to the touch.    You have less movement of the painful area.    You have questions or concerns about your condition or care.  CARE AGREEMENT:    You have the right to help plan your care. Learn about your health condition and how it may be treated. Discuss treatment options with your healthcare providers to decide what care you want to receive. You always have the right to refuse treatment.

## 2025-07-07 NOTE — ED PROVIDER NOTE - CLINICAL SUMMARY MEDICAL DECISION MAKING FREE TEXT BOX
50 y/o f presents c/o atraumatic left upper back pain x 3 weeks.  No neuro deficits on exam, likely msk etiology, given toradol in ED, stable for d/c, given rx pain meds, f/u pmd

## 2025-07-09 DIAGNOSIS — M79.602 PAIN IN LEFT ARM: ICD-10-CM

## 2025-07-09 DIAGNOSIS — M54.89 OTHER DORSALGIA: ICD-10-CM

## 2025-07-09 DIAGNOSIS — M25.512 PAIN IN LEFT SHOULDER: ICD-10-CM
